# Patient Record
Sex: FEMALE | Race: BLACK OR AFRICAN AMERICAN | NOT HISPANIC OR LATINO | URBAN - METROPOLITAN AREA
[De-identification: names, ages, dates, MRNs, and addresses within clinical notes are randomized per-mention and may not be internally consistent; named-entity substitution may affect disease eponyms.]

---

## 2022-07-29 ENCOUNTER — EMERGENCY (EMERGENCY)
Facility: HOSPITAL | Age: 22
LOS: 1 days | Discharge: HOME | End: 2022-07-29
Attending: EMERGENCY MEDICINE
Payer: MEDICAID

## 2022-07-29 VITALS
TEMPERATURE: 99 F | HEIGHT: 60 IN | HEART RATE: 80 BPM | RESPIRATION RATE: 17 BRPM | DIASTOLIC BLOOD PRESSURE: 78 MMHG | OXYGEN SATURATION: 98 % | WEIGHT: 123.9 LBS | SYSTOLIC BLOOD PRESSURE: 121 MMHG

## 2022-07-29 LAB
ALBUMIN SERPL ELPH-MCNC: 3.6 G/DL — SIGNIFICANT CHANGE UP (ref 3.5–5)
ALP SERPL-CCNC: 86 U/L — SIGNIFICANT CHANGE UP (ref 40–120)
ALT FLD-CCNC: 17 U/L DA — SIGNIFICANT CHANGE UP (ref 10–60)
ANION GAP SERPL CALC-SCNC: 5 MMOL/L — SIGNIFICANT CHANGE UP (ref 5–17)
APPEARANCE UR: CLEAR — SIGNIFICANT CHANGE UP
AST SERPL-CCNC: 15 U/L — SIGNIFICANT CHANGE UP (ref 10–40)
BACTERIA # UR AUTO: NEGATIVE /HPF — SIGNIFICANT CHANGE UP
BASOPHILS # BLD AUTO: 0.03 K/UL — SIGNIFICANT CHANGE UP (ref 0–0.2)
BASOPHILS NFR BLD AUTO: 0.3 % — SIGNIFICANT CHANGE UP (ref 0–2)
BILIRUB SERPL-MCNC: 0.3 MG/DL — SIGNIFICANT CHANGE UP (ref 0.2–1.2)
BILIRUB UR-MCNC: NEGATIVE — SIGNIFICANT CHANGE UP
BLD GP AB SCN SERPL QL: SIGNIFICANT CHANGE UP
BUN SERPL-MCNC: 12 MG/DL — SIGNIFICANT CHANGE UP (ref 7–18)
CALCIUM SERPL-MCNC: 9.4 MG/DL — SIGNIFICANT CHANGE UP (ref 8.4–10.5)
CHLORIDE SERPL-SCNC: 110 MMOL/L — HIGH (ref 96–108)
CO2 SERPL-SCNC: 25 MMOL/L — SIGNIFICANT CHANGE UP (ref 22–31)
COLOR SPEC: YELLOW — SIGNIFICANT CHANGE UP
COMMENT - URINE: SIGNIFICANT CHANGE UP
CREAT SERPL-MCNC: 0.78 MG/DL — SIGNIFICANT CHANGE UP (ref 0.5–1.3)
DIFF PNL FLD: ABNORMAL
EGFR: 110 ML/MIN/1.73M2 — SIGNIFICANT CHANGE UP
EOSINOPHIL # BLD AUTO: 0.52 K/UL — HIGH (ref 0–0.5)
EOSINOPHIL NFR BLD AUTO: 4.8 % — SIGNIFICANT CHANGE UP (ref 0–6)
EPI CELLS # UR: NEGATIVE /HPF — SIGNIFICANT CHANGE UP
GLUCOSE SERPL-MCNC: 85 MG/DL — SIGNIFICANT CHANGE UP (ref 70–99)
GLUCOSE UR QL: NEGATIVE — SIGNIFICANT CHANGE UP
HCG SERPL-ACNC: 12 MIU/ML — HIGH
HCT VFR BLD CALC: 43.7 % — SIGNIFICANT CHANGE UP (ref 34.5–45)
HGB BLD-MCNC: 14 G/DL — SIGNIFICANT CHANGE UP (ref 11.5–15.5)
HIV 1 & 2 AB SERPL IA.RAPID: SIGNIFICANT CHANGE UP
IMM GRANULOCYTES NFR BLD AUTO: 0.4 % — SIGNIFICANT CHANGE UP (ref 0–1.5)
KETONES UR-MCNC: NEGATIVE — SIGNIFICANT CHANGE UP
LEUKOCYTE ESTERASE UR-ACNC: ABNORMAL
LYMPHOCYTES # BLD AUTO: 2.49 K/UL — SIGNIFICANT CHANGE UP (ref 1–3.3)
LYMPHOCYTES # BLD AUTO: 22.8 % — SIGNIFICANT CHANGE UP (ref 13–44)
MCHC RBC-ENTMCNC: 28.7 PG — SIGNIFICANT CHANGE UP (ref 27–34)
MCHC RBC-ENTMCNC: 32 GM/DL — SIGNIFICANT CHANGE UP (ref 32–36)
MCV RBC AUTO: 89.7 FL — SIGNIFICANT CHANGE UP (ref 80–100)
MONOCYTES # BLD AUTO: 0.73 K/UL — SIGNIFICANT CHANGE UP (ref 0–0.9)
MONOCYTES NFR BLD AUTO: 6.7 % — SIGNIFICANT CHANGE UP (ref 2–14)
NEUTROPHILS # BLD AUTO: 7.09 K/UL — SIGNIFICANT CHANGE UP (ref 1.8–7.4)
NEUTROPHILS NFR BLD AUTO: 65 % — SIGNIFICANT CHANGE UP (ref 43–77)
NITRITE UR-MCNC: NEGATIVE — SIGNIFICANT CHANGE UP
NRBC # BLD: 0 /100 WBCS — SIGNIFICANT CHANGE UP (ref 0–0)
PH UR: 6 — SIGNIFICANT CHANGE UP (ref 5–8)
PLATELET # BLD AUTO: 388 K/UL — SIGNIFICANT CHANGE UP (ref 150–400)
POTASSIUM SERPL-MCNC: 3.7 MMOL/L — SIGNIFICANT CHANGE UP (ref 3.5–5.3)
POTASSIUM SERPL-SCNC: 3.7 MMOL/L — SIGNIFICANT CHANGE UP (ref 3.5–5.3)
PROT SERPL-MCNC: 7.6 G/DL — SIGNIFICANT CHANGE UP (ref 6–8.3)
PROT UR-MCNC: 15
RBC # BLD: 4.87 M/UL — SIGNIFICANT CHANGE UP (ref 3.8–5.2)
RBC # FLD: 13.2 % — SIGNIFICANT CHANGE UP (ref 10.3–14.5)
RBC CASTS # UR COMP ASSIST: SIGNIFICANT CHANGE UP /HPF (ref 0–2)
SODIUM SERPL-SCNC: 140 MMOL/L — SIGNIFICANT CHANGE UP (ref 135–145)
SP GR SPEC: 1.02 — SIGNIFICANT CHANGE UP (ref 1.01–1.02)
UROBILINOGEN FLD QL: NEGATIVE — SIGNIFICANT CHANGE UP
WBC # BLD: 10.9 K/UL — HIGH (ref 3.8–10.5)
WBC # FLD AUTO: 10.9 K/UL — HIGH (ref 3.8–10.5)
WBC UR QL: ABNORMAL /HPF (ref 0–5)

## 2022-07-29 PROCEDURE — 85025 COMPLETE CBC W/AUTO DIFF WBC: CPT

## 2022-07-29 PROCEDURE — 84702 CHORIONIC GONADOTROPIN TEST: CPT

## 2022-07-29 PROCEDURE — 99284 EMERGENCY DEPT VISIT MOD MDM: CPT

## 2022-07-29 PROCEDURE — 76801 OB US < 14 WKS SINGLE FETUS: CPT | Mod: 26

## 2022-07-29 PROCEDURE — 81001 URINALYSIS AUTO W/SCOPE: CPT

## 2022-07-29 PROCEDURE — 76817 TRANSVAGINAL US OBSTETRIC: CPT | Mod: 26

## 2022-07-29 PROCEDURE — 76802 OB US < 14 WKS ADDL FETUS: CPT

## 2022-07-29 PROCEDURE — 86703 HIV-1/HIV-2 1 RESULT ANTBDY: CPT

## 2022-07-29 PROCEDURE — 36415 COLL VENOUS BLD VENIPUNCTURE: CPT

## 2022-07-29 PROCEDURE — 86901 BLOOD TYPING SEROLOGIC RH(D): CPT

## 2022-07-29 PROCEDURE — 99284 EMERGENCY DEPT VISIT MOD MDM: CPT | Mod: 25

## 2022-07-29 PROCEDURE — 80053 COMPREHEN METABOLIC PANEL: CPT

## 2022-07-29 PROCEDURE — 76817 TRANSVAGINAL US OBSTETRIC: CPT

## 2022-07-29 PROCEDURE — 86850 RBC ANTIBODY SCREEN: CPT

## 2022-07-29 PROCEDURE — 86900 BLOOD TYPING SEROLOGIC ABO: CPT

## 2022-07-29 RX ORDER — CEPHALEXIN 500 MG
250 CAPSULE ORAL EVERY 6 HOURS
Refills: 0 | Status: DISCONTINUED | OUTPATIENT
Start: 2022-07-29 | End: 2022-08-02

## 2022-07-29 RX ORDER — CEPHALEXIN 500 MG
1 CAPSULE ORAL
Qty: 20 | Refills: 0
Start: 2022-07-29 | End: 2022-08-02

## 2022-07-29 RX ADMIN — Medication 250 MILLIGRAM(S): at 15:21

## 2022-07-29 NOTE — ED ADULT NURSE NOTE - OBJECTIVE STATEMENT
AOX4 +ambulatory patient reports tested positive for home pregnancy complaining of vaginal bleeding x 1 week

## 2022-07-29 NOTE — ED ADULT NURSE NOTE - MODE OF DISCHARGE
[address] [id number] [phone]      Lizette N Daryn  [address]  [place] [address]        June 12, 2020    NORMAL PAP SMEAR     I am glad to inform you that the results of your recent PAP Smear were reported    Normal. There was no evidence of cancer.     You are encouraged to repeat this exam on an annual basis or as otherwise   instructed.   Pap and HPV result is normal, repeat pap in 3-5 years. Annual yearly.         Kathy Dewey MD       
Ambulatory

## 2022-07-29 NOTE — ED PROVIDER NOTE - NS_ATTENDINGSCRIBE_ED_ALL_ED
Not applicable
I personally performed the service described in the documentation recorded by the scribe in my presence, and it accurately and completely records my words and actions.

## 2022-07-29 NOTE — ED PROVIDER NOTE - CLINICAL SUMMARY MEDICAL DECISION MAKING FREE TEXT BOX
21 year old female presenting with vaginal bleeding. Will get labs, HCG, ultrasound, Type and screen.

## 2022-07-29 NOTE — ED PROVIDER NOTE - PATIENT PORTAL LINK FT
You can access the FollowMyHealth Patient Portal offered by [facility] by registering at the following website: [email or web address]. By joining [employer]’s FollowMyHealth portal, you will also be able to view your health information using other applications (apps) compatible with our system.

## 2022-07-29 NOTE — ED ADULT TRIAGE NOTE - HISTORY OF COVID-19 VACCINATION
VITALS:   T(C): 36.4 (07-30-21 @ 15:19), Max: 36.4 (07-30-21 @ 15:19)  HR: 74 (07-30-21 @ 15:19) (74 - 74)  BP: 113/74 (07-30-21 @ 15:19) (113/74 - 113/74)  RR: 18 (07-30-21 @ 15:19) (18 - 18)  SpO2: 96% (07-30-21 @ 15:19) (96% - 96%)    GENERAL: NAD, lying in bed comfortably  HEAD: Atraumatic, Normocephalic  EYES: EOMI  ENT: DRY MUCOUS MEMBRANES, GLOSSITIS  CHEST/LUNG: Clear to auscultation bilaterally;  Unlabored respirations  HEART: Regular rate and rhythm;  ABDOMEN:  Soft, nontender, nondistended  EXTREMITIES:  No clubbing, cyanosis, or edema  NERVOUS SYSTEM:  A&Ox3, mild intention tremor, no nystagmus, no pronator drift No VITALS:   T(C): 36.4 (07-30-21 @ 15:19), Max: 36.4 (07-30-21 @ 15:19)  HR: 74 (07-30-21 @ 15:19) (74 - 74)  BP: 113/74 (07-30-21 @ 15:19) (113/74 - 113/74)  RR: 18 (07-30-21 @ 15:19) (18 - 18)  SpO2: 96% (07-30-21 @ 15:19) (96% - 96%)    GENERAL: NAD, lying in bed comfortably  HEAD: Atraumatic, Normocephalic  EYES: EOMI  ENT: DRY MUCOUS MEMBRANES, GLOSSITIS  CHEST/LUNG: Clear to auscultation bilaterally;  Unlabored respirations  HEART: Regular rate and rhythm;  ABDOMEN:  Soft, nontender, nondistended  EXTREMITIES:  No clubbing, cyanosis, or edema  MSK: Bony pelvis non tender and stable   NERVOUS SYSTEM:  A&Ox3, mild intention tremor, no nystagmus, no pronator drift  Neck/Back: Midline TTP low T to upper L spine, no step off or deformity. No midline C spine TTP.

## 2022-07-29 NOTE — ED PROVIDER NOTE - OBJECTIVE STATEMENT
21 year old female  and 2  with no PMHx is presenting to the ED with chief complaint of spotting and bleeding recently. Patient's LMP was . Her spotting started on , [facility] Ever since , she has been bleeding and been changing 3 pads a day. She felt lightheaded recently and abdominal pain yesterday. Patient states that she didn't notice any tissue but does have blood clots.

## 2022-07-29 NOTE — ED PROVIDER NOTE - ENMT, MLM
1. Refill request received from: [facility] pharmacy  2. Medication Requested: Pantoprazol 40mg tablet  3. Directions:Table 1 tablet (40mg) by mouth daily. Take 30-60 minutes before a meal  4. Quantity:60  5. Last Office Visit: 2/11/22                    Has it been over a year since the last appointment (6 months for diabetes)? np                    If No:     Move on to next question.                    If Yes:                      Change refill quantity to 1 month.                      Route to Provider or Pool & let them know its been over a year since patient has been seen.                      If they do not have an upcoming appointment- reach out to family to schedule or route to .  6. Next Appointment Scheduled for: 8/12/22  7. Last refill: 5/28/22  8. Sent To: PROVIDER     Airway patent, Nasal mucosa clear. Mouth with normal mucosa. Throat has no vesicles, no oropharyngeal exudates and uvula is midline.

## 2024-06-12 NOTE — ED PROVIDER NOTE - DOMESTIC TRAVEL HIGH RISK QUESTION
Detail Level: Simple
Additional Notes: Pt grandma was just diagnosed with melanoma
Render Risk Assessment In Note?: no
No

## 2025-06-06 NOTE — ED ADULT NURSE NOTE - NSFALLRSKINDICATORS_ED_ALL_ED
[FreeTextEntry1] : Assessment: #HLD H #RA #Non Obstructive CAD  #PFO    Plan: non obstructive CAD on CCTA  Cont Tricor, add Rosuvastatin 5mg  Start ASA 81mh TIW  Repeat Lipids 6-8 weeks F/u with Rheumatologist for RA management Low Chol diet  Aggressive risks modifications Activities as tolerated  F/u in 1 year or sooner PRN  att note pt seen and examined agree with above
no